# Patient Record
Sex: MALE | Race: WHITE | NOT HISPANIC OR LATINO | URBAN - METROPOLITAN AREA
[De-identification: names, ages, dates, MRNs, and addresses within clinical notes are randomized per-mention and may not be internally consistent; named-entity substitution may affect disease eponyms.]

---

## 2018-09-24 ENCOUNTER — NEW PATIENT (OUTPATIENT)
Dept: URBAN - METROPOLITAN AREA CLINIC 30 | Facility: CLINIC | Age: 58
End: 2018-09-24
Payer: COMMERCIAL

## 2018-09-24 DIAGNOSIS — E11.9 TYPE 2 DIABETES MELLITUS WITHOUT COMPLICATIONS: ICD-10-CM

## 2018-09-24 DIAGNOSIS — E11.3393 DIABETES MELLITUS TYPE 2 WITH MODERATE NON-PROLIFE: Primary | ICD-10-CM

## 2018-09-24 PROCEDURE — 92250 FUNDUS PHOTOGRAPHY W/I&R: CPT | Performed by: OPTOMETRIST

## 2018-09-24 PROCEDURE — 92004 COMPRE OPH EXAM NEW PT 1/>: CPT | Performed by: OPTOMETRIST

## 2018-09-24 ASSESSMENT — INTRAOCULAR PRESSURE
OS: 16
OD: 16

## 2018-09-24 ASSESSMENT — KERATOMETRY
OD: 42.06
OS: 42.19

## 2018-09-24 ASSESSMENT — VISUAL ACUITY
OS: 20/20
OD: 20/25

## 2019-03-25 ENCOUNTER — FOLLOW UP ESTABLISHED (OUTPATIENT)
Dept: URBAN - METROPOLITAN AREA CLINIC 30 | Facility: CLINIC | Age: 59
End: 2019-03-25
Payer: COMMERCIAL

## 2019-03-25 DIAGNOSIS — E11.3511 DIABETES MELLITUS TYPE 2 WITH PROLIFERATIVE RETINO: Primary | ICD-10-CM

## 2019-03-25 DIAGNOSIS — E11.39 TYPE 2 DIABETES MELLITUS WITH OPHTHALMIC COMPLICAT: ICD-10-CM

## 2019-03-25 DIAGNOSIS — E11.3592 DIABETES MELLITUS TYPE 2 WITH PROLIFERATIVE RETINO: ICD-10-CM

## 2019-03-25 PROCEDURE — 92134 CPTRZ OPH DX IMG PST SGM RTA: CPT | Performed by: OPTOMETRIST

## 2019-03-25 PROCEDURE — 92014 COMPRE OPH EXAM EST PT 1/>: CPT | Performed by: OPTOMETRIST

## 2019-03-25 ASSESSMENT — VISUAL ACUITY
OD: 20/25
OS: 20/30

## 2019-03-25 ASSESSMENT — INTRAOCULAR PRESSURE
OD: 16
OS: 16

## 2019-03-25 ASSESSMENT — KERATOMETRY
OD: 42.03
OS: 42.11

## 2019-04-12 ENCOUNTER — FOLLOW UP ESTABLISHED (OUTPATIENT)
Dept: URBAN - METROPOLITAN AREA CLINIC 24 | Facility: CLINIC | Age: 59
End: 2019-04-12
Payer: COMMERCIAL

## 2019-04-12 DIAGNOSIS — E11.3513 DIABETES MELLITUS TYPE 2 WITH PROLIFERATIVE RETINO: Primary | ICD-10-CM

## 2019-04-12 PROCEDURE — 92134 CPTRZ OPH DX IMG PST SGM RTA: CPT | Performed by: OPHTHALMOLOGY

## 2019-04-12 PROCEDURE — 92004 COMPRE OPH EXAM NEW PT 1/>: CPT | Performed by: OPHTHALMOLOGY

## 2019-04-12 ASSESSMENT — INTRAOCULAR PRESSURE
OS: 17
OD: 18

## 2019-05-10 ENCOUNTER — FOLLOW UP ESTABLISHED (OUTPATIENT)
Dept: URBAN - METROPOLITAN AREA CLINIC 24 | Facility: CLINIC | Age: 59
End: 2019-05-10
Payer: COMMERCIAL

## 2019-05-10 PROCEDURE — 92250 FUNDUS PHOTOGRAPHY W/I&R: CPT | Performed by: OPHTHALMOLOGY

## 2019-05-10 PROCEDURE — 92235 FLUORESCEIN ANGRPH MLTIFRAME: CPT | Performed by: OPHTHALMOLOGY

## 2019-05-10 ASSESSMENT — INTRAOCULAR PRESSURE
OS: 15
OD: 22

## 2019-11-14 ENCOUNTER — FOLLOW UP ESTABLISHED (OUTPATIENT)
Dept: URBAN - METROPOLITAN AREA CLINIC 24 | Facility: CLINIC | Age: 59
End: 2019-11-14
Payer: COMMERCIAL

## 2019-11-14 PROCEDURE — 92014 COMPRE OPH EXAM EST PT 1/>: CPT | Performed by: OPHTHALMOLOGY

## 2019-11-14 PROCEDURE — 92134 CPTRZ OPH DX IMG PST SGM RTA: CPT | Performed by: OPHTHALMOLOGY

## 2019-11-14 PROCEDURE — 67028 INJECTION EYE DRUG: CPT | Performed by: OPHTHALMOLOGY

## 2019-11-14 ASSESSMENT — INTRAOCULAR PRESSURE
OD: 13
OS: 12

## 2020-01-23 ENCOUNTER — FOLLOW UP ESTABLISHED (OUTPATIENT)
Dept: URBAN - METROPOLITAN AREA CLINIC 24 | Facility: CLINIC | Age: 60
End: 2020-01-23
Payer: COMMERCIAL

## 2020-01-23 PROCEDURE — 92134 CPTRZ OPH DX IMG PST SGM RTA: CPT | Performed by: OPHTHALMOLOGY

## 2020-01-23 PROCEDURE — 92014 COMPRE OPH EXAM EST PT 1/>: CPT | Performed by: OPHTHALMOLOGY

## 2020-01-23 ASSESSMENT — INTRAOCULAR PRESSURE
OS: 24
OD: 22

## 2020-03-03 ENCOUNTER — FOLLOW UP ESTABLISHED (OUTPATIENT)
Dept: URBAN - METROPOLITAN AREA CLINIC 24 | Facility: CLINIC | Age: 60
End: 2020-03-03
Payer: COMMERCIAL

## 2020-03-03 PROCEDURE — 92014 COMPRE OPH EXAM EST PT 1/>: CPT | Performed by: OPTOMETRIST

## 2020-03-03 PROCEDURE — 92015 DETERMINE REFRACTIVE STATE: CPT | Performed by: OPTOMETRIST

## 2020-03-03 ASSESSMENT — INTRAOCULAR PRESSURE
OS: 17
OD: 16

## 2020-03-03 ASSESSMENT — VISUAL ACUITY
OS: 20/20
OD: 20/20

## 2020-04-03 ENCOUNTER — FOLLOW UP ESTABLISHED (OUTPATIENT)
Dept: URBAN - METROPOLITAN AREA CLINIC 10 | Facility: CLINIC | Age: 60
End: 2020-04-03
Payer: COMMERCIAL

## 2020-04-03 PROCEDURE — 92134 CPTRZ OPH DX IMG PST SGM RTA: CPT | Performed by: OPHTHALMOLOGY

## 2020-04-03 PROCEDURE — 67028 INJECTION EYE DRUG: CPT | Performed by: OPHTHALMOLOGY

## 2020-04-03 PROCEDURE — 92014 COMPRE OPH EXAM EST PT 1/>: CPT | Performed by: OPHTHALMOLOGY

## 2020-04-03 ASSESSMENT — INTRAOCULAR PRESSURE
OS: 16
OD: 20

## 2020-04-08 ENCOUNTER — Encounter (OUTPATIENT)
Dept: URBAN - METROPOLITAN AREA CLINIC 44 | Facility: CLINIC | Age: 60
End: 2020-04-08
Payer: COMMERCIAL

## 2020-04-08 DIAGNOSIS — Z01.818 ENCOUNTER FOR OTHER PREPROCEDURAL EXAMINATION: Primary | ICD-10-CM

## 2020-04-08 PROCEDURE — 99213 OFFICE O/P EST LOW 20 MIN: CPT | Performed by: PHYSICIAN ASSISTANT

## 2020-04-08 RX ORDER — INSULIN DEGLUDEC INJECTION 100 U/ML
INJECTION, SOLUTION SUBCUTANEOUS
Qty: 0 | Refills: 0 | Status: ACTIVE
Start: 2020-04-08

## 2020-04-08 RX ORDER — INSULIN LISPRO 100 [IU]/ML
INJECTION, SOLUTION INTRAVENOUS; SUBCUTANEOUS
Qty: 0 | Refills: 0 | Status: INACTIVE
Start: 2020-04-08 | End: 2022-01-13

## 2020-04-10 ENCOUNTER — SURGERY (OUTPATIENT)
Dept: URBAN - METROPOLITAN AREA SURGERY 12 | Facility: SURGERY | Age: 60
End: 2020-04-10
Payer: COMMERCIAL

## 2020-04-10 PROCEDURE — 67040 LASER TREATMENT OF RETINA: CPT | Performed by: OPHTHALMOLOGY

## 2020-04-10 RX ORDER — PREDNISOLONE ACETATE 10 MG/ML
1 % SUSPENSION/ DROPS OPHTHALMIC
Qty: 1 | Refills: 1 | Status: INACTIVE
Start: 2020-04-10 | End: 2020-06-16

## 2020-04-10 RX ORDER — OFLOXACIN 3 MG/ML
0.3 % SOLUTION/ DROPS OPHTHALMIC
Qty: 1 | Refills: 1 | Status: INACTIVE
Start: 2020-04-10 | End: 2020-04-16

## 2020-04-11 ENCOUNTER — POST OP (OUTPATIENT)
Dept: URBAN - METROPOLITAN AREA CLINIC 10 | Facility: CLINIC | Age: 60
End: 2020-04-11

## 2020-04-11 PROCEDURE — 99024 POSTOP FOLLOW-UP VISIT: CPT | Performed by: OPTOMETRIST

## 2020-04-11 ASSESSMENT — INTRAOCULAR PRESSURE: OD: 18

## 2020-04-24 ENCOUNTER — POST OP (OUTPATIENT)
Dept: URBAN - METROPOLITAN AREA CLINIC 10 | Facility: CLINIC | Age: 60
End: 2020-04-24
Payer: COMMERCIAL

## 2020-04-24 DIAGNOSIS — Z79.4 LONG TERM (CURRENT) USE OF INSULIN: ICD-10-CM

## 2020-04-24 PROCEDURE — 99024 POSTOP FOLLOW-UP VISIT: CPT | Performed by: OPHTHALMOLOGY

## 2020-04-24 PROCEDURE — 92134 CPTRZ OPH DX IMG PST SGM RTA: CPT | Performed by: OPHTHALMOLOGY

## 2020-04-24 ASSESSMENT — INTRAOCULAR PRESSURE
OD: 22
OS: 22

## 2020-06-16 ENCOUNTER — POST OP (OUTPATIENT)
Dept: URBAN - METROPOLITAN AREA CLINIC 51 | Facility: CLINIC | Age: 60
End: 2020-06-16
Payer: COMMERCIAL

## 2020-06-16 PROCEDURE — 99024 POSTOP FOLLOW-UP VISIT: CPT | Performed by: OPTOMETRIST

## 2020-06-16 PROCEDURE — 92134 CPTRZ OPH DX IMG PST SGM RTA: CPT | Performed by: OPTOMETRIST

## 2020-06-16 ASSESSMENT — VISUAL ACUITY
OD: 20/30
OS: 20/20

## 2020-06-16 ASSESSMENT — INTRAOCULAR PRESSURE
OD: 21
OS: 22

## 2020-06-30 ENCOUNTER — FOLLOW UP ESTABLISHED (OUTPATIENT)
Dept: URBAN - METROPOLITAN AREA CLINIC 10 | Facility: CLINIC | Age: 60
End: 2020-06-30
Payer: COMMERCIAL

## 2020-06-30 PROCEDURE — 92134 CPTRZ OPH DX IMG PST SGM RTA: CPT | Performed by: OPHTHALMOLOGY

## 2020-06-30 ASSESSMENT — INTRAOCULAR PRESSURE
OS: 21
OD: 21

## 2020-07-31 ENCOUNTER — FOLLOW UP ESTABLISHED (OUTPATIENT)
Dept: URBAN - METROPOLITAN AREA CLINIC 51 | Facility: CLINIC | Age: 60
End: 2020-07-31
Payer: COMMERCIAL

## 2020-07-31 PROCEDURE — 92014 COMPRE OPH EXAM EST PT 1/>: CPT | Performed by: OPTOMETRIST

## 2020-07-31 PROCEDURE — 92134 CPTRZ OPH DX IMG PST SGM RTA: CPT | Performed by: OPTOMETRIST

## 2020-07-31 ASSESSMENT — KERATOMETRY
OS: 42.43
OD: 42.11

## 2020-07-31 ASSESSMENT — VISUAL ACUITY
OS: 20/20
OD: 20/30

## 2020-07-31 ASSESSMENT — INTRAOCULAR PRESSURE
OD: 21
OS: 20

## 2020-09-22 ENCOUNTER — FOLLOW UP ESTABLISHED (OUTPATIENT)
Dept: URBAN - METROPOLITAN AREA CLINIC 10 | Facility: CLINIC | Age: 60
End: 2020-09-22
Payer: COMMERCIAL

## 2020-09-22 PROCEDURE — 92235 FLUORESCEIN ANGRPH MLTIFRAME: CPT | Performed by: OPHTHALMOLOGY

## 2020-09-22 PROCEDURE — 92250 FUNDUS PHOTOGRAPHY W/I&R: CPT | Performed by: OPHTHALMOLOGY

## 2020-09-22 ASSESSMENT — INTRAOCULAR PRESSURE
OS: 14
OD: 15

## 2020-12-22 ENCOUNTER — FOLLOW UP ESTABLISHED (OUTPATIENT)
Dept: URBAN - METROPOLITAN AREA CLINIC 10 | Facility: CLINIC | Age: 60
End: 2020-12-22
Payer: COMMERCIAL

## 2020-12-22 PROCEDURE — 92134 CPTRZ OPH DX IMG PST SGM RTA: CPT | Performed by: OPHTHALMOLOGY

## 2020-12-22 ASSESSMENT — INTRAOCULAR PRESSURE
OS: 16
OD: 13

## 2021-03-16 ENCOUNTER — FOLLOW UP ESTABLISHED (OUTPATIENT)
Dept: URBAN - METROPOLITAN AREA CLINIC 10 | Facility: CLINIC | Age: 61
End: 2021-03-16
Payer: COMMERCIAL

## 2021-03-16 DIAGNOSIS — H25.813 COMBINED FORMS OF AGE-RELATED CATARACT, BILATERAL: ICD-10-CM

## 2021-03-16 PROCEDURE — 92134 CPTRZ OPH DX IMG PST SGM RTA: CPT | Performed by: OPHTHALMOLOGY

## 2021-03-16 PROCEDURE — 99214 OFFICE O/P EST MOD 30 MIN: CPT | Performed by: OPHTHALMOLOGY

## 2021-03-16 ASSESSMENT — INTRAOCULAR PRESSURE
OD: 11
OS: 14

## 2021-06-29 ENCOUNTER — OFFICE VISIT (OUTPATIENT)
Dept: URBAN - METROPOLITAN AREA CLINIC 10 | Facility: CLINIC | Age: 61
End: 2021-06-29
Payer: COMMERCIAL

## 2021-06-29 PROCEDURE — 92134 CPTRZ OPH DX IMG PST SGM RTA: CPT | Performed by: OPHTHALMOLOGY

## 2021-06-29 PROCEDURE — 92235 FLUORESCEIN ANGRPH MLTIFRAME: CPT | Performed by: OPHTHALMOLOGY

## 2021-06-29 ASSESSMENT — INTRAOCULAR PRESSURE
OS: 15
OD: 12

## 2021-06-29 NOTE — IMPRESSION/PLAN
Impression: IDDM2 PDR w DME OD>OS - Avstn x'19. Imprv'd. Extend '20 - NEW [facility] OD - Rslv'd w VIT laser Apr '20 Plan: Hx: [[BG, A1C.  PDR c NVD / DME OD>OS. MUST improve DM - Start Tx x Apr '19 OU. Inj [place] Summer '19 inj w light PRP - THEN VIT [facility]G OD - Rslv'd w VIT laser Apr '20]]. TODAY Avastin inj OU (proc note) RTC 13-14w ND/inj/OCT plan Avstn OU (maintain). RESUMED OU q3m  DRCR. net inj -     [place]oved back [place] to [id number] [facility]

## 2021-10-05 ENCOUNTER — OFFICE VISIT (OUTPATIENT)
Dept: URBAN - METROPOLITAN AREA CLINIC 10 | Facility: CLINIC | Age: 61
End: 2021-10-05
Payer: COMMERCIAL

## 2021-10-05 PROCEDURE — 92134 CPTRZ OPH DX IMG PST SGM RTA: CPT | Performed by: OPHTHALMOLOGY

## 2021-10-05 PROCEDURE — 99214 OFFICE O/P EST MOD 30 MIN: CPT | Performed by: OPHTHALMOLOGY

## 2021-10-05 ASSESSMENT — INTRAOCULAR PRESSURE
OS: 14
OD: 13

## 2021-10-05 NOTE — IMPRESSION/PLAN
Impression: IDDM2 PDR w DME OD>OS - Avstn x'19. Imprv'd. Extend '20 - NEW [facility] OD - Rslv'd w VIT laser Apr '20 Plan: Hx: [[BG, A1C.  PDR c NVD / DME OD>OS. MUST improve DM - Start Tx x Apr '19 OU. Inj [place] Summer '19 inj w light PRP - THEN VIT HMG OD - Rslv'd w VIT laser Apr '20]]. TODAY Avastin inj OU (proc note) RTC 13-14w dil, pos OCT, eval - h/o  Avstn OU (maintain). RESUMED OU q3m  DRCR. net inj -     Moved back [place] to [place]

## 2021-11-09 ENCOUNTER — OFFICE VISIT (OUTPATIENT)
Dept: URBAN - METROPOLITAN AREA CLINIC 51 | Facility: CLINIC | Age: 61
End: 2021-11-09
Payer: COMMERCIAL

## 2021-11-09 DIAGNOSIS — H52.4 PRESBYOPIA: ICD-10-CM

## 2021-11-09 DIAGNOSIS — E11.3213 DIABETES MELLITUS TYPE 2 WITH MILD NON-PROLIFERATIVE RETINOPATHY WITH MACULAR EDEMA, BILATERAL: ICD-10-CM

## 2021-11-09 DIAGNOSIS — H04.123 TEAR FILM INSUFFICIENCY OF BILATERAL LACRIMAL GLANDS: ICD-10-CM

## 2021-11-09 DIAGNOSIS — H43.813 VITREOUS DEGENERATION, BILATERAL: ICD-10-CM

## 2021-11-09 PROCEDURE — 92250 FUNDUS PHOTOGRAPHY W/I&R: CPT | Performed by: OPTOMETRIST

## 2021-11-09 PROCEDURE — 99214 OFFICE O/P EST MOD 30 MIN: CPT | Performed by: OPTOMETRIST

## 2021-11-09 PROCEDURE — 92134 CPTRZ OPH DX IMG PST SGM RTA: CPT | Performed by: OPTOMETRIST

## 2021-11-09 ASSESSMENT — INTRAOCULAR PRESSURE
OD: 20
OS: 18

## 2021-11-09 ASSESSMENT — VISUAL ACUITY
OD: 20/40
OS: 20/20

## 2021-11-09 NOTE — IMPRESSION/PLAN
Impression: Combined forms of age-related cataract, bilateral: H25.813. Plan:  Discussed cataracts with patient. Discussed treatment options. Surgical treatment is recommended. Surgical risks and benefits discussed. Patient elects surgical treatment. Recommend surgery OU, OD first. Aim OD: plano. Aim OS: plano. Patient will need glasses for all near work, including computer. Patient is not an candidate for Specialty IOL due to DM complications OU. Outcome of surgery limitations include:  Diabetes mellitus Type 2 with mild non-proliferative retinopathy with macular edema, bilateral, Tear film insufficiency of bilateral lacrimal glands.

## 2021-11-09 NOTE — IMPRESSION/PLAN
Impression: Diabetes mellitus Type 2 with mild non-proliferative retinopathy with macular edema, bilateral: R20.9259. Mac OCT shows edema OU, fundus photos show retinopathy OU. Plan: Educated patient on the damage to blood vessels and macular swelling of the retina. Recommended patient continue care w/ Dr Tasia Frank Stressed the importance of compliance with blood glucose and blood pressure control in preventing progression of retinopathy and/or maculopathy and potentially irreversible vision loss. Note - DM eye disease is related to primary health problems. Vision stabilization depends on improve general health. Send letter to PCP.

## 2021-11-09 NOTE — IMPRESSION/PLAN
Impression: Tear film insufficiency of bilateral lacrimal glands: H04.123. Plan: Educated patient on the reduced production of tears from the lacrimal glands. Recommended patient use AT QID to treat symptoms as well as use warm compresses 10 minutes per day.

## 2022-01-11 ENCOUNTER — OFFICE VISIT (OUTPATIENT)
Dept: URBAN - METROPOLITAN AREA CLINIC 10 | Facility: CLINIC | Age: 62
End: 2022-01-11
Payer: COMMERCIAL

## 2022-01-11 PROCEDURE — 92134 CPTRZ OPH DX IMG PST SGM RTA: CPT | Performed by: OPHTHALMOLOGY

## 2022-01-11 PROCEDURE — 99214 OFFICE O/P EST MOD 30 MIN: CPT | Performed by: OPHTHALMOLOGY

## 2022-01-11 ASSESSMENT — INTRAOCULAR PRESSURE
OD: 18
OS: 19

## 2022-01-11 NOTE — IMPRESSION/PLAN
Impression: Cataract Plan: TODAY  mod cataract   - this did progress after vitrectomy surgery. ok now to  address cataract sx w primary eye team PRN.

## 2022-01-11 NOTE — IMPRESSION/PLAN
Impression: DM2 PDR w DME OD>OS - Avstn x'19. Imprv'd. Extend '20 - [facility] OD - Rslv'd w VIT laser Apr '20 Plan: Hx: [[BG, A1C.  PDR c NVD / DME OD>OS. MUST improve DM - Start Tx x Apr '19 OU. Inj [place] Summer '19 inj w light PRP - THEN VIT [facility]G OD - Rslv'd w VIT laser Apr '20]]. TODAY Avastin inj OU (proc note)    ---   Future ND2 ? RTC 13-14w pos FA plan  Avstn OU (maintain). RESUMED OU q3m  LAYLA. net inj -     Moved back [place] to [facility]

## 2022-01-13 ENCOUNTER — ADULT PHYSICAL (OUTPATIENT)
Dept: URBAN - METROPOLITAN AREA CLINIC 51 | Facility: CLINIC | Age: 62
End: 2022-01-13
Payer: COMMERCIAL

## 2022-01-13 PROCEDURE — 92025 CPTRIZED CORNEAL TOPOGRAPHY: CPT | Performed by: OPHTHALMOLOGY

## 2022-01-13 PROCEDURE — 99213 OFFICE O/P EST LOW 20 MIN: CPT | Performed by: PHYSICIAN ASSISTANT

## 2022-01-13 ASSESSMENT — PACHYMETRY
OS: 3.08
OD: 23.87
OD: 3.20
OS: 23.91

## 2022-01-24 ENCOUNTER — PRE-OPERATIVE VISIT (OUTPATIENT)
Dept: URBAN - METROPOLITAN AREA CLINIC 44 | Facility: CLINIC | Age: 62
End: 2022-01-24
Payer: COMMERCIAL

## 2022-01-24 DIAGNOSIS — H25.812 COMBINED FORMS OF AGE-RELATED CATARACT, LEFT EYE: ICD-10-CM

## 2022-01-24 DIAGNOSIS — H52.223 REGULAR ASTIGMATISM, BILATERAL: ICD-10-CM

## 2022-01-24 PROCEDURE — 99204 OFFICE O/P NEW MOD 45 MIN: CPT | Performed by: OPHTHALMOLOGY

## 2022-01-24 PROCEDURE — 99214 OFFICE O/P EST MOD 30 MIN: CPT | Performed by: OPHTHALMOLOGY

## 2022-01-24 ASSESSMENT — PACHYMETRY
OD: 3.20
OS: 23.91
OD: 23.87
OS: 3.08

## 2022-01-24 NOTE — IMPRESSION/PLAN
Impression: Combined forms of age-related cataract, bilateral: H25.813. Plan: Discussed cataract diagnosis with the patient. Discussed and reviewed treatment options for cataracts. Surgical treatment is required for cataracts. Risks and benefits of surgical treatment were discussed and understood. Patient elects surgical treatment. Recommend surgery OU,  OD first. PLAN STD LENS, DISTANCE/AIM, NO UPGRADES, REVIEWED ANA, PLAN LRI. PATIENT UNDERSTANDS THE NEED FOR GLASSES POST-OP FOR BEST OVER ALL VISION.  Discussed limitations to vision post op due to NIDDM WITH MAULAR EDEMA, DRY EYES, VITREOUS DEGENERATION./  DEXYCUE

## 2022-01-24 NOTE — IMPRESSION/PLAN
Impression: Combined forms of age-related cataract, left eye: H25.812. Plan: If first eye doing well, ok to proceed with second eye surgery SEE PLAN H25.814

## 2022-01-31 ENCOUNTER — SURGERY (OUTPATIENT)
Dept: URBAN - METROPOLITAN AREA SURGERY 19 | Facility: SURGERY | Age: 62
End: 2022-01-31
Payer: COMMERCIAL

## 2022-01-31 PROCEDURE — 66982 XCAPSL CTRC RMVL CPLX WO ECP: CPT | Performed by: OPHTHALMOLOGY

## 2022-02-01 ENCOUNTER — POST-OPERATIVE VISIT (OUTPATIENT)
Dept: URBAN - METROPOLITAN AREA CLINIC 51 | Facility: CLINIC | Age: 62
End: 2022-02-01
Payer: COMMERCIAL

## 2022-02-01 PROCEDURE — 99024 POSTOP FOLLOW-UP VISIT: CPT | Performed by: OPTOMETRIST

## 2022-02-01 ASSESSMENT — INTRAOCULAR PRESSURE: OD: 17

## 2022-02-01 NOTE — IMPRESSION/PLAN
Impression: S/P Cataract Extraction by phacoemulsification with IOL placement; LRI (Limbal Relaxing Incision) OD - 1 Day. Encounter for surgical aftercare following surgery on a sense organ  Z48.810. Plan: Reviewed with patient post op findings. Healing. Moderate swelling and inflammation observed on today's examination which accounts for blurred vision. Discussed that vision will improve as swelling/ inflammation resolves. Pt review on drops to use: ATs at least QID Eye shield nightly as instructed and do not rub surgical eye

## 2022-02-09 ENCOUNTER — ADULT PHYSICAL (OUTPATIENT)
Dept: URBAN - METROPOLITAN AREA CLINIC 51 | Facility: CLINIC | Age: 62
End: 2022-02-09
Payer: COMMERCIAL

## 2022-02-09 DIAGNOSIS — Z48.810 ENCOUNTER FOR SURGICAL AFTERCARE FOLLOWING SURGERY ON A SENSE ORGAN: Primary | ICD-10-CM

## 2022-02-09 PROCEDURE — 99213 OFFICE O/P EST LOW 20 MIN: CPT | Performed by: PHYSICIAN ASSISTANT

## 2022-02-09 PROCEDURE — 99024 POSTOP FOLLOW-UP VISIT: CPT | Performed by: OPTOMETRIST

## 2022-02-09 ASSESSMENT — INTRAOCULAR PRESSURE
OS: 20
OS: 20
OD: 19
OD: 19

## 2022-02-09 ASSESSMENT — VISUAL ACUITY
OD: 20/20
OS: 20/20

## 2022-02-09 NOTE — IMPRESSION/PLAN
Impression: S/P Cataract Extraction by phacoemulsification with IOL placement; LRI (Limbal Relaxing Incision) OD - 9 Days. Encounter for surgical aftercare following surgery on a sense organ  Z48.810. Plan: Pt is healing well with first eye cataract surgery and is ready to proceed with cataract surgery.

## 2022-04-07 ENCOUNTER — OFFICE VISIT (OUTPATIENT)
Dept: URBAN - METROPOLITAN AREA CLINIC 51 | Facility: CLINIC | Age: 62
End: 2022-04-07
Payer: COMMERCIAL

## 2022-04-07 DIAGNOSIS — Z96.1 PRESENCE OF INTRAOCULAR LENS: ICD-10-CM

## 2022-04-07 PROCEDURE — 99214 OFFICE O/P EST MOD 30 MIN: CPT | Performed by: OPTOMETRIST

## 2022-04-07 PROCEDURE — 92250 FUNDUS PHOTOGRAPHY W/I&R: CPT | Performed by: OPTOMETRIST

## 2022-04-07 ASSESSMENT — INTRAOCULAR PRESSURE
OS: 16
OD: 15

## 2022-04-07 ASSESSMENT — KERATOMETRY
OD: 42.11
OS: 42.19

## 2022-04-07 ASSESSMENT — VISUAL ACUITY
OS: 20/20
OD: 20/20

## 2022-04-07 NOTE — IMPRESSION/PLAN
Impression: Presbyopia: H52.4. Plan: Pt inquired about Vuity eye drops.  Will discuss after cataract surgery OS

## 2022-04-07 NOTE — IMPRESSION/PLAN
Impression: Combined forms of age-related cataract, left eye: H25.812. Concomitant intraocular disease (e.g., diabetic retinopathy) requiring monitoring or treatment that is prevented by the presence of cataract. I recommend cataract surgery for this patient. Lens-induced disease threatening vision or ocular health (including, but not limited to, phacomorphic or phacolytic glaucoma). High probability of accelerating cataract development as a result of a concomitant or subsequent procedure (e.g., pars plana vitrectomy, iridocyclectomy, procedure for ocular trauma) and treatments such as external beam irradiation. Cataract interfering with the performance of vitreoretinal surgery. Plan:  Discussed cataracts with patient. Discussed treatment options. Surgical treatment is recommended. Surgical risks and benefits discussed. Patient elects surgical treatment. Recommend surgery OS. Aim OS: plano. Patient will need glasses for all near work, including computer. Outcome of surgery limitations include:  Type 2 diabetes mellitus with proliferative diabetic retinopathy, inactive OU. Had prior PRP and hx of CME OU. Cleared by Dr. Karie Yusuf.  This is patient's second eye

## 2022-04-07 NOTE — IMPRESSION/PLAN
Impression: DM2 PDR w DME OD>OS - Avstn x'19. Imprv'd. Extend '20 - [facility] OD - Rslv'd w VIT laser Apr '20 Plan: Stable. Inactive PDR OD. Understands will limit or fluctuate visual outcome s/p cataract extraction OS. 
Has had IOL OD

## 2022-04-13 ENCOUNTER — ADULT PHYSICAL (OUTPATIENT)
Dept: URBAN - METROPOLITAN AREA CLINIC 51 | Facility: CLINIC | Age: 62
End: 2022-04-13
Payer: COMMERCIAL

## 2022-04-13 DIAGNOSIS — Z01.818 ENCOUNTER FOR OTHER PREPROCEDURAL EXAMINATION: Primary | ICD-10-CM

## 2022-04-13 DIAGNOSIS — E11.3513 TYPE 2 DIABETES MELLITUS WITH PROLIFERATIVE DIABETIC RETINOPATHY WITH MACULAR EDEMA, BILATERAL: ICD-10-CM

## 2022-04-13 DIAGNOSIS — H25.813 COMBINED FORMS OF AGE-RELATED CATARACT, BILATERAL: ICD-10-CM

## 2022-04-13 PROCEDURE — 99213 OFFICE O/P EST LOW 20 MIN: CPT | Performed by: PHYSICIAN ASSISTANT

## 2022-04-19 ENCOUNTER — OFFICE VISIT (OUTPATIENT)
Dept: URBAN - METROPOLITAN AREA CLINIC 10 | Facility: CLINIC | Age: 62
End: 2022-04-19
Payer: COMMERCIAL

## 2022-04-19 DIAGNOSIS — E11.3513 TYPE 2 DIABETES MELLITUS WITH PROLIFERATIVE DIABETIC RETINOPATHY WITH MACULAR EDEMA, BILATERAL: Primary | ICD-10-CM

## 2022-04-19 PROCEDURE — 92134 CPTRZ OPH DX IMG PST SGM RTA: CPT | Performed by: OPHTHALMOLOGY

## 2022-04-19 PROCEDURE — 92235 FLUORESCEIN ANGRPH MLTIFRAME: CPT | Performed by: OPHTHALMOLOGY

## 2022-04-19 PROCEDURE — 92250 FUNDUS PHOTOGRAPHY W/I&R: CPT | Performed by: OPHTHALMOLOGY

## 2022-04-19 ASSESSMENT — INTRAOCULAR PRESSURE
OS: 18
OD: 17

## 2022-04-19 NOTE — IMPRESSION/PLAN
Impression: DM2 PDR w DME OD>OS - Avstn x'19. Imprv'd. Extend '20 - NEW [facility] OD - Rslv'd w VIT laser Apr '20 Plan: Hx: [[BG, A1C.  PDR c NVD / DME OD>OS. MUST improve DM - Start Tx x Apr '19 OU. Inj [place] Summer '19 inj w light PRP - THEN VIT HMG OD - Rslv'd w VIT laser Apr '20]]. TODAY Avastin inj OU (proc note)    ---   Future ND2 ? RTC  14w ND1 injx/OCT plan Avstn OU (maintain). RESUMED OU q3m  DRCR. net inj -     Moved back [place] to [id number] [facility]

## 2022-04-20 ENCOUNTER — PRE-OPERATIVE VISIT (OUTPATIENT)
Dept: URBAN - METROPOLITAN AREA CLINIC 44 | Facility: CLINIC | Age: 62
End: 2022-04-20
Payer: COMMERCIAL

## 2022-04-20 DIAGNOSIS — H25.812 COMBINED FORMS OF AGE-RELATED CATARACT, LEFT EYE: Primary | ICD-10-CM

## 2022-04-20 DIAGNOSIS — H52.222 REGULAR ASTIGMATISM, LEFT EYE: ICD-10-CM

## 2022-04-20 PROCEDURE — 99214 OFFICE O/P EST MOD 30 MIN: CPT | Performed by: OPHTHALMOLOGY

## 2022-04-20 ASSESSMENT — PACHYMETRY
OS: 23.91
OS: 3.08

## 2022-04-20 NOTE — IMPRESSION/PLAN
Impression: Combined forms of age-related cataract, left eye: H25.812. Plan: Discussed cataract diagnosis with the patient. Discussed and reviewed treatment options for cataracts. Surgical treatment is required for cataracts. Risks and benefits of surgical treatment were discussed and understood. Patient elects surgical treatment. Recommend surgery OS PLAN STD LENS, DISTANCE AIM, NO UPGRADES, REVIEWED ANA, PLAN LRI. PATIENT UNDERSTANDS THE NEED FOR GLASSES POST-OP FOR BEST OVER ALL VISION.  Discussed limitations to vision post op due to NIDDM, VITREOUS DEGENERATION, DEXYCU

## 2022-04-26 ENCOUNTER — SURGERY (OUTPATIENT)
Dept: URBAN - METROPOLITAN AREA SURGERY 19 | Facility: SURGERY | Age: 62
End: 2022-04-26
Payer: COMMERCIAL

## 2022-04-26 DIAGNOSIS — H25.812 COMBINED FORMS OF AGE-RELATED CATARACT, LEFT EYE: Primary | ICD-10-CM

## 2022-04-26 DIAGNOSIS — H52.222 REGULAR ASTIGMATISM, LEFT EYE: ICD-10-CM

## 2022-04-26 PROCEDURE — 66984 XCAPSL CTRC RMVL W/O ECP: CPT | Performed by: OPHTHALMOLOGY

## 2022-04-27 ENCOUNTER — POST-OPERATIVE VISIT (OUTPATIENT)
Dept: URBAN - METROPOLITAN AREA CLINIC 51 | Facility: CLINIC | Age: 62
End: 2022-04-27
Payer: COMMERCIAL

## 2022-04-27 DIAGNOSIS — Z48.810 ENCOUNTER FOR SURGICAL AFTERCARE FOLLOWING SURGERY ON A SENSE ORGAN: Primary | ICD-10-CM

## 2022-04-27 PROCEDURE — 99024 POSTOP FOLLOW-UP VISIT: CPT | Performed by: OPTOMETRIST

## 2022-04-27 ASSESSMENT — INTRAOCULAR PRESSURE: OS: 17

## 2022-04-27 NOTE — IMPRESSION/PLAN
Impression: S/P Cataract Extraction by phacoemulsification with IOL placement; LRI (Limbal Relaxing Incision) OS - 1 Day. Encounter for surgical aftercare following surgery on a sense organ  Z48.810.  Plan: expected surgical post op examination,  vision will fluctuate, ,vision will continue to improve , may be light sensitive,  Artificial tears qid minimum
ed pt that we did pupil stretch, and will take time to go back to normal

## 2022-05-24 ENCOUNTER — POST-OPERATIVE VISIT (OUTPATIENT)
Dept: URBAN - METROPOLITAN AREA CLINIC 51 | Facility: CLINIC | Age: 62
End: 2022-05-24
Payer: COMMERCIAL

## 2022-05-24 DIAGNOSIS — Z96.1 PRESENCE OF INTRAOCULAR LENS: Primary | ICD-10-CM

## 2022-05-24 PROCEDURE — 99024 POSTOP FOLLOW-UP VISIT: CPT | Performed by: OPTOMETRIST

## 2022-05-24 ASSESSMENT — VISUAL ACUITY
OS: 20/20
OD: 20/20

## 2022-05-24 ASSESSMENT — INTRAOCULAR PRESSURE
OS: 19
OD: 17

## 2022-05-24 NOTE — IMPRESSION/PLAN
Impression: S/P Cataract Extraction by phacoemulsification with IOL placement; LRI (Limbal Relaxing Incision) OS - 28 Days. Presence of intraocular lens  Z96.1. Plan: Discussed options on updating new MRx. Patient prefers: OTC readers Artificial tears QID OU for comfort.

## 2022-07-26 ENCOUNTER — OFFICE VISIT (OUTPATIENT)
Dept: URBAN - METROPOLITAN AREA CLINIC 10 | Facility: CLINIC | Age: 62
End: 2022-07-26
Payer: COMMERCIAL

## 2022-07-26 DIAGNOSIS — E11.3513 TYPE 2 DIABETES MELLITUS WITH PROLIFERATIVE DIABETIC RETINOPATHY WITH MACULAR EDEMA, BILATERAL: Primary | ICD-10-CM

## 2022-07-26 PROCEDURE — 92134 CPTRZ OPH DX IMG PST SGM RTA: CPT | Performed by: OPHTHALMOLOGY

## 2022-07-26 ASSESSMENT — INTRAOCULAR PRESSURE
OS: 14
OD: 19

## 2022-07-26 NOTE — IMPRESSION/PLAN
Impression: DM2 PDR w DME OD>OS - Avstn x'19. Imprv'd. Extend '20 - NEW [facility] OD - Rslv'd w VIT laser Apr '20 Plan: Hx: [[BG, A1C.  PDR c NVD / DME OD>OS. MUST improve DM - Start Tx x Apr '19 OU. Inj [place] Summer '19 inj w light PRP - THEN VIT HMG OD - Rslv'd w VIT laser Apr '20]]. TODAY Avastin inj OU (proc note)    ---   Future ND2 ? RTC  14w ND2 injx/OCT plan Avstn OU (maintain). RESUMED OU q3m  DRCR. net inj -     [place]oved back [place] to [id number] [facility]

## 2022-11-01 ENCOUNTER — OFFICE VISIT (OUTPATIENT)
Dept: URBAN - METROPOLITAN AREA CLINIC 10 | Facility: CLINIC | Age: 62
End: 2022-11-01

## 2022-11-01 DIAGNOSIS — E11.3513 TYPE 2 DIABETES MELLITUS WITH PROLIFERATIVE DIABETIC RETINOPATHY WITH MACULAR EDEMA, BILATERAL: Primary | ICD-10-CM

## 2022-11-01 PROCEDURE — 92134 CPTRZ OPH DX IMG PST SGM RTA: CPT | Performed by: OPHTHALMOLOGY

## 2022-11-01 ASSESSMENT — INTRAOCULAR PRESSURE
OD: 19
OS: 15

## 2022-11-01 NOTE — IMPRESSION/PLAN
Impression: DM2 PDR w DME OD>OS - Avstn x'19. Imprv'd. Extend '20 - NEW [facility] OD - Rslv'd w VIT laser Apr '20 Plan: Hx: [[BG, A1C.  PDR c NVD / DME OD>OS. MUST improve DM - Start Tx x Apr '19 OU. Inj [place] Summer '19 inj w light PRP - THEN VIT HMG OD - Rslv'd w VIT laser Apr '20]]. TODAY Avastin inj OU (proc note)    ---   Future FA /  ND2 ? RTC  14-16w dil, pos OCT, eval - h/o  Avstn OU (maintain). RESUMED OU q3m  DRCR. net inj -     Moved back [place] to [address] [facility]

## 2023-02-21 ENCOUNTER — OFFICE VISIT (OUTPATIENT)
Dept: URBAN - METROPOLITAN AREA CLINIC 10 | Facility: CLINIC | Age: 63
End: 2023-02-21
Payer: COMMERCIAL

## 2023-02-21 DIAGNOSIS — H43.813 VITREOUS DEGENERATION, BILATERAL: ICD-10-CM

## 2023-02-21 DIAGNOSIS — E11.3513 TYPE 2 DIABETES MELLITUS WITH PROLIFERATIVE DIABETIC RETINOPATHY WITH MACULAR EDEMA, BILATERAL: Primary | ICD-10-CM

## 2023-02-21 DIAGNOSIS — H25.813 COMBINED FORMS OF AGE-RELATED CATARACT, BILATERAL: ICD-10-CM

## 2023-02-21 PROCEDURE — 92134 CPTRZ OPH DX IMG PST SGM RTA: CPT | Performed by: OPHTHALMOLOGY

## 2023-02-21 PROCEDURE — 99214 OFFICE O/P EST MOD 30 MIN: CPT | Performed by: OPHTHALMOLOGY

## 2023-02-21 ASSESSMENT — INTRAOCULAR PRESSURE
OS: 13
OD: 14

## 2023-02-21 NOTE — IMPRESSION/PLAN
Impression: DM2 PDR w DME OD>OS - Avstn x'19. Imprv'd. Extend '20 - NEW [facility] OD - Rslv'd w VIT laser Apr '20 - Byooviz x '23  Plan: Hx: [[BG, A1C. PDR NVD/DME OD>OS. MUST improve DM - Start Tx x '19 OU. Inj [place] Summer '19 inj w light PRP - THEN VIT HMG OD - Rslv'd w VIT laser Apr '20]]. TODAY Avstn OU (proc note)    ---   Future ND2 / exam ? 
      RTC  14-16w plan FA update / plan BYOOVIZ OU (maintain). RESUMED OU q3m  DRCR. net inj -     Moved back [place] to [id number] [facility] Move away from OFF LABEL Drug -   Rvw'd FDA-apprv'd on-label biosimilar BYOOVIZ. Prefer to non-FDA-apprv'd off-label Avastin. Biosimilar to ranibizimab injx. D/w'd pt r/b/a and options. Pt expressed understanding desires proceed. On-label FDA-approved drug, yet remains theoretical low (but not zero) risk adverse events -Understood.

## 2023-02-21 NOTE — IMPRESSION/PLAN
Impression: Vitreous degen  / PCIOL Plan: DONE w Ce/IOL after  vitrectomy surgery. keep f/u w primary eye team PRN.

## 2023-06-05 ENCOUNTER — OFFICE VISIT (OUTPATIENT)
Dept: URBAN - METROPOLITAN AREA CLINIC 51 | Facility: CLINIC | Age: 63
End: 2023-06-05
Payer: COMMERCIAL

## 2023-06-05 DIAGNOSIS — Z96.1 PRESENCE OF INTRAOCULAR LENS: ICD-10-CM

## 2023-06-05 DIAGNOSIS — E11.3513 TYPE 2 DIABETES MELLITUS WITH PROLIFERATIVE DIABETIC RETINOPATHY WITH MACULAR EDEMA, BILATERAL: Primary | ICD-10-CM

## 2023-06-05 DIAGNOSIS — H43.813 VITREOUS DEGENERATION, BILATERAL: ICD-10-CM

## 2023-06-05 PROCEDURE — 99213 OFFICE O/P EST LOW 20 MIN: CPT | Performed by: OPTOMETRIST

## 2023-06-05 PROCEDURE — 92134 CPTRZ OPH DX IMG PST SGM RTA: CPT | Performed by: OPTOMETRIST

## 2023-06-05 ASSESSMENT — VISUAL ACUITY
OD: 20/20
OS: 20/20

## 2023-06-05 ASSESSMENT — INTRAOCULAR PRESSURE
OD: 17
OS: 16

## 2023-06-05 ASSESSMENT — KERATOMETRY
OS: 41.88
OD: 42.00

## 2023-06-05 NOTE — IMPRESSION/PLAN
Impression: Type 2 diabetes mellitus with proliferative diabetic retinopathy with macular edema, bilateral: Z32.2761. Plan: Pt was scheduled to see Dr. Chelsie Scott, but missed appt. Will reschedule to [facility] care.

## 2023-06-05 NOTE — IMPRESSION/PLAN
Impression: Vitreous degen  / PCIOL Plan: DONE w Ce/IOL after  vitrectomy surgery. No new symptoms for floaters or flashes of light.

## 2023-06-20 ENCOUNTER — OFFICE VISIT (OUTPATIENT)
Dept: URBAN - METROPOLITAN AREA CLINIC 10 | Facility: CLINIC | Age: 63
End: 2023-06-20
Payer: COMMERCIAL

## 2023-06-20 DIAGNOSIS — E11.3513 TYPE 2 DIABETES MELLITUS WITH PROLIFERATIVE DIABETIC RETINOPATHY WITH MACULAR EDEMA, BILATERAL: Primary | ICD-10-CM

## 2023-06-20 PROCEDURE — 92235 FLUORESCEIN ANGRPH MLTIFRAME: CPT | Performed by: OPHTHALMOLOGY

## 2023-06-20 PROCEDURE — 92134 CPTRZ OPH DX IMG PST SGM RTA: CPT | Performed by: OPHTHALMOLOGY

## 2023-06-20 PROCEDURE — 99214 OFFICE O/P EST MOD 30 MIN: CPT | Performed by: OPHTHALMOLOGY

## 2023-06-20 ASSESSMENT — INTRAOCULAR PRESSURE
OD: 14
OS: 16

## 2023-06-20 NOTE — IMPRESSION/PLAN
Impression: DM2 PDR w DME OD>OS - Avstn x'19. Imprv'd. Extend '20 - NEW [facility] OD - Rslv'd w VIT laser Apr '20 - Byooviz x '23 Plan: Hx: [[BG, A1C. PDR NVD/DME OD>OS. MUST improve DM - Start Tx x '19 OU. Inj [place] Summer '19 inj w light PRP - THEN VIT HMG OD - Rslv'd w VIT laser Apr '20]]. TODAY Avstn OU (proc note)    ---   Future exam / ND? RTC  14-16w ND inj/OCT plan CIMERLI 0.3mg  OU (maintain). RESUMED OU q3m  DRCR. net inj -     Moved back [place] to [id number] [facility] Move away from OFF LABEL Drug - Rvw'd FDA-apprv'd on-label biosimilar Cimerli

## 2024-02-23 ENCOUNTER — OFFICE VISIT (OUTPATIENT)
Dept: URBAN - METROPOLITAN AREA CLINIC 10 | Facility: CLINIC | Age: 64
End: 2024-02-23
Payer: COMMERCIAL

## 2024-02-23 DIAGNOSIS — E11.3513 TYPE 2 DIABETES MELLITUS WITH PROLIFERATIVE DIABETIC RETINOPATHY WITH MACULAR EDEMA, BILATERAL: Primary | ICD-10-CM

## 2024-02-23 PROCEDURE — 92134 CPTRZ OPH DX IMG PST SGM RTA: CPT | Performed by: OPHTHALMOLOGY

## 2024-06-11 ENCOUNTER — OFFICE VISIT (OUTPATIENT)
Dept: URBAN - METROPOLITAN AREA CLINIC 10 | Facility: CLINIC | Age: 64
End: 2024-06-11
Payer: COMMERCIAL

## 2024-06-11 DIAGNOSIS — E11.3513 TYPE 2 DIABETES MELLITUS WITH PROLIFERATIVE DIABETIC RETINOPATHY WITH MACULAR EDEMA, BILATERAL: Primary | ICD-10-CM

## 2024-06-11 DIAGNOSIS — H25.813 COMBINED FORMS OF AGE-RELATED CATARACT, BILATERAL: ICD-10-CM

## 2024-06-11 DIAGNOSIS — H43.813 VITREOUS DEGENERATION, BILATERAL: ICD-10-CM

## 2024-06-11 PROCEDURE — 92134 CPTRZ OPH DX IMG PST SGM RTA: CPT | Performed by: OPHTHALMOLOGY

## 2024-06-11 PROCEDURE — 99214 OFFICE O/P EST MOD 30 MIN: CPT | Performed by: OPHTHALMOLOGY

## 2024-06-11 ASSESSMENT — INTRAOCULAR PRESSURE
OD: 13
OS: 13

## 2024-10-01 ENCOUNTER — OFFICE VISIT (OUTPATIENT)
Dept: URBAN - METROPOLITAN AREA CLINIC 10 | Facility: CLINIC | Age: 64
End: 2024-10-01
Payer: COMMERCIAL

## 2024-10-01 DIAGNOSIS — E11.3513 TYPE 2 DIABETES MELLITUS WITH PROLIFERATIVE DIABETIC RETINOPATHY WITH MACULAR EDEMA, BILATERAL: Primary | ICD-10-CM

## 2024-10-01 PROCEDURE — 92134 CPTRZ OPH DX IMG PST SGM RTA: CPT | Performed by: OPHTHALMOLOGY

## 2024-10-01 PROCEDURE — 92235 FLUORESCEIN ANGRPH MLTIFRAME: CPT | Performed by: OPHTHALMOLOGY

## 2024-10-01 ASSESSMENT — INTRAOCULAR PRESSURE
OD: 19
OS: 15